# Patient Record
Sex: FEMALE | URBAN - METROPOLITAN AREA
[De-identification: names, ages, dates, MRNs, and addresses within clinical notes are randomized per-mention and may not be internally consistent; named-entity substitution may affect disease eponyms.]

---

## 2023-05-22 ENCOUNTER — TELEPHONE ENCOUNTER (OUTPATIENT)
Dept: URBAN - METROPOLITAN AREA CLINIC 72 | Facility: CLINIC | Age: 38
End: 2023-05-22

## 2023-05-22 ENCOUNTER — OFFICE VISIT (OUTPATIENT)
Dept: URBAN - METROPOLITAN AREA CLINIC 72 | Facility: CLINIC | Age: 38
End: 2023-05-22

## 2023-05-23 ENCOUNTER — LAB OUTSIDE AN ENCOUNTER (OUTPATIENT)
Dept: URBAN - METROPOLITAN AREA CLINIC 72 | Facility: CLINIC | Age: 38
End: 2023-05-23

## 2023-05-23 ENCOUNTER — DASHBOARD ENCOUNTERS (OUTPATIENT)
Age: 38
End: 2023-05-23

## 2023-05-23 ENCOUNTER — CLAIMS CREATED FROM THE CLAIM WINDOW (OUTPATIENT)
Dept: URBAN - METROPOLITAN AREA CLINIC 72 | Facility: CLINIC | Age: 38
End: 2023-05-23
Payer: SELF-PAY

## 2023-05-23 VITALS
BODY MASS INDEX: 28.71 KG/M2 | DIASTOLIC BLOOD PRESSURE: 54 MMHG | SYSTOLIC BLOOD PRESSURE: 144 MMHG | HEIGHT: 62 IN | HEART RATE: 67 BPM | TEMPERATURE: 97.9 F | WEIGHT: 156 LBS

## 2023-05-23 DIAGNOSIS — R10.9 RIGHT SIDED ABDOMINAL PAIN: ICD-10-CM

## 2023-05-23 DIAGNOSIS — K59.00 CONSTIPATION, UNSPECIFIED CONSTIPATION TYPE: ICD-10-CM

## 2023-05-23 DIAGNOSIS — K59.01 CONSTIPATION: ICD-10-CM

## 2023-05-23 DIAGNOSIS — R10.84 ABDOMINAL CRAMPING, GENERALIZED: ICD-10-CM

## 2023-05-23 PROBLEM — 14760008: Status: ACTIVE | Noted: 2023-05-23

## 2023-05-23 PROCEDURE — 99204 OFFICE O/P NEW MOD 45 MIN: CPT | Performed by: NURSE PRACTITIONER

## 2023-05-23 PROCEDURE — 99204 OFFICE O/P NEW MOD 45 MIN: CPT | Performed by: INTERNAL MEDICINE

## 2023-05-23 RX ORDER — OMEPRAZOLE 20 MG/1
1 TABLET 30 MINUTES BEFORE MORNING MEAL TABLET, DELAYED RELEASE ORAL ONCE A DAY
Qty: 30 | Refills: 1 | OUTPATIENT
Start: 2023-05-23

## 2023-05-23 NOTE — PHYSICAL EXAM GASTROINTESTINAL
soft, ride sided abdominal pain Both and right lower quadrant.  Point of maximal tenderness mid abdomen., nondistended , normal bowel sounds, no guarding or rigidity, no rebound tenderness

## 2023-05-23 NOTE — HPI-TODAY'S VISIT:
38-year-old female new to the clinic here for an ER follow-up.  Seen in the emergency department 2/3/2023 for pain from her right rib area down to her lower back.  Apparently had ultrasound and x-rays performed at [facility] with normal results 5 to 6 months prior.  Had CT scan performed.  No acute findings.  Was recommended to follow-up with gastroenterology and was prescribed dicyclomine.  CT abdomen and pelvis with contrast 2/3/2023.  2.6 cm cystic lesion left ovary likely represents dominant follicle otherwise normal.  Labs 2/3/2023.  CMP: Normal.  Lipase normal.  Magnesium normal.  UA slightly cloudy with small amount of leukocytes otherwise normal.  Occasional bacteria with 1-5 WBCs no RBCs.  Urine culture no growth after 24 hours.  CBC normal with Hgb 13.8 hCG negative.   used  On interview today she had the pain last night that woke her up, currently pulsating mid right abdomen.  She feels that after eating cream, beans or spicy foods it hurts more.  BM daily but small amount.  Some days, no BM.  Has noticed some dark spots in her stool.  Not all black, no BRBPR.  Tried some motrin the other day and some pills to help her have a BM but it didn't help.  Denies weight loss.  No CP, SOB, palpitations, syncope, near-syncope or problems with anesthesia previously.  She denies pregnancy.

## 2023-08-21 ENCOUNTER — CLAIMS CREATED FROM THE CLAIM WINDOW (OUTPATIENT)
Dept: URBAN - METROPOLITAN AREA MEDICAL CENTER 40 | Facility: MEDICAL CENTER | Age: 38
End: 2023-08-21
Payer: SELF-PAY

## 2023-08-21 ENCOUNTER — CLAIMS CREATED FROM THE CLAIM WINDOW (OUTPATIENT)
Dept: URBAN - METROPOLITAN AREA MEDICAL CENTER 40 | Facility: MEDICAL CENTER | Age: 38
End: 2023-08-21

## 2023-08-21 DIAGNOSIS — K63.5 BENIGN COLON POLYP: ICD-10-CM

## 2023-08-21 DIAGNOSIS — K59.09 COLONIC CONSTIPATION: ICD-10-CM

## 2023-08-21 DIAGNOSIS — R10.84 ABDOMINAL PAIN, GENERALIZED: ICD-10-CM

## 2023-08-21 PROCEDURE — 45380 COLONOSCOPY AND BIOPSY: CPT | Performed by: INTERNAL MEDICINE

## 2023-08-21 RX ORDER — OMEPRAZOLE 20 MG/1
1 TABLET 30 MINUTES BEFORE MORNING MEAL TABLET, DELAYED RELEASE ORAL ONCE A DAY
Qty: 30 | Refills: 1 | Status: ACTIVE | COMMUNITY
Start: 2023-05-23